# Patient Record
Sex: MALE | Race: WHITE | Employment: UNEMPLOYED | URBAN - METROPOLITAN AREA
[De-identification: names, ages, dates, MRNs, and addresses within clinical notes are randomized per-mention and may not be internally consistent; named-entity substitution may affect disease eponyms.]

---

## 2023-10-12 ENCOUNTER — TELEPHONE (OUTPATIENT)
Dept: PEDIATRIC NEUROLOGY | Facility: HOSPITAL | Age: 28
End: 2023-10-12
Payer: MEDICAID

## 2023-10-12 DIAGNOSIS — F39 MOOD DISORDER (CMS-HCC): ICD-10-CM

## 2023-10-12 DIAGNOSIS — F91.9 DISRUPTIVE BEHAVIOR DISORDER: ICD-10-CM

## 2023-10-12 RX ORDER — DIVALPROEX SODIUM 500 MG/1
1000 TABLET, FILM COATED, EXTENDED RELEASE ORAL 2 TIMES DAILY
COMMUNITY
Start: 2015-04-20 | End: 2023-10-12 | Stop reason: SDUPTHER

## 2023-10-12 RX ORDER — TOPIRAMATE 100 MG/1
100 TABLET, FILM COATED ORAL 2 TIMES DAILY
COMMUNITY
Start: 2015-03-04 | End: 2023-10-12 | Stop reason: SDUPTHER

## 2023-10-12 RX ORDER — LAMOTRIGINE 150 MG/1
75 TABLET ORAL 2 TIMES DAILY
Qty: 30 TABLET | Refills: 5 | Status: SHIPPED | OUTPATIENT
Start: 2023-10-12 | End: 2024-03-22 | Stop reason: SDUPTHER

## 2023-10-12 RX ORDER — DIVALPROEX SODIUM 500 MG/1
1000 TABLET, FILM COATED, EXTENDED RELEASE ORAL 2 TIMES DAILY
Qty: 120 TABLET | Refills: 5 | Status: SHIPPED | OUTPATIENT
Start: 2023-10-12 | End: 2024-03-22 | Stop reason: SDUPTHER

## 2023-10-12 RX ORDER — ZIPRASIDONE HYDROCHLORIDE 60 MG/1
60 CAPSULE ORAL
COMMUNITY
Start: 2016-01-14 | End: 2023-10-12 | Stop reason: SDUPTHER

## 2023-10-12 RX ORDER — LAMOTRIGINE 150 MG/1
75 TABLET ORAL 2 TIMES DAILY
COMMUNITY
Start: 2015-08-14 | End: 2023-10-12 | Stop reason: SDUPTHER

## 2023-10-12 RX ORDER — ZIPRASIDONE HYDROCHLORIDE 60 MG/1
60 CAPSULE ORAL
Qty: 60 CAPSULE | Refills: 5 | Status: SHIPPED | OUTPATIENT
Start: 2023-10-12 | End: 2024-03-22 | Stop reason: SDUPTHER

## 2023-10-12 RX ORDER — TOPIRAMATE 100 MG/1
100 TABLET, FILM COATED ORAL 2 TIMES DAILY
Qty: 60 TABLET | Refills: 5 | Status: SHIPPED | OUTPATIENT
Start: 2023-10-12 | End: 2023-10-13 | Stop reason: SDUPTHER

## 2023-10-12 NOTE — TELEPHONE ENCOUNTER
Rx Refill Request Telephone Encounter    Name:  Raheem Spence  :  [id number]  Medication Name:  4 REFILL SCRIPTS    1)  DIAVALPROEX SODIUM  MG ORAL TABLET EXTENDED RELEASE 24 HOUR TAKE 2 TAB (1,000 MG) BY MOUTH TWICE DAILY  2)  LAMOTRIGINE - 150 MG ORAL TAB; GIVE 1/2 TAB (75MG) BY MOUTH TWICE DAILY  3) TOPIRAMATE 100 MG ORAL TAB; TAKE 1 TAB BY MOUTH 2X A DAY  4)  ZIPRASIDONE HCl - 60 MG ORAL CAP ; GIVE 1 CAP BY MOUTH 2X A DAY  Specific Pharmacy location:  [facility]  Date of last appointment:  23  Date of next appointment:  -  Best number to reach patient:  -

## 2023-10-13 ENCOUNTER — TELEPHONE (OUTPATIENT)
Dept: PEDIATRIC NEUROLOGY | Facility: HOSPITAL | Age: 28
End: 2023-10-13
Payer: MEDICAID

## 2023-10-13 DIAGNOSIS — F39 MOOD DISORDER (CMS-HCC): ICD-10-CM

## 2023-10-13 RX ORDER — TOPIRAMATE 100 MG/1
100 TABLET, FILM COATED ORAL NIGHTLY
Qty: 30 TABLET | Refills: 5 | Status: SHIPPED | OUTPATIENT
Start: 2023-10-13 | End: 2024-03-22

## 2023-10-13 NOTE — TELEPHONE ENCOUNTER
In old system- script was for 1-100mg tab at bedtime.  Per dr. Krishnamurthy last clinic note 1 tab. I do not see any further phone notes where it was increased to BID. Will call pharmacy

## 2023-10-13 NOTE — TELEPHONE ENCOUNTER
Pharm is calling to clarify a scripit   They received a scripit for topiramate  100mg 1 tab twice daily but they have on record 100 mg 1 tab by mouth at bedtime, ( this is what is in the old system)   Clarification is needed  Please Call.

## 2023-11-09 DIAGNOSIS — F41.9 ANXIETY: ICD-10-CM

## 2023-11-09 RX ORDER — LORAZEPAM 1 MG/1
1 TABLET ORAL 2 TIMES DAILY
COMMUNITY
Start: 2015-05-22 | End: 2023-11-09 | Stop reason: SDUPTHER

## 2023-11-09 RX ORDER — LORAZEPAM 1 MG/1
1 TABLET ORAL 2 TIMES DAILY
Qty: 56 TABLET | Refills: 2 | Status: SHIPPED | OUTPATIENT
Start: 2023-11-09 | End: 2024-01-24

## 2024-01-24 DIAGNOSIS — F41.9 ANXIETY: ICD-10-CM

## 2024-01-24 RX ORDER — LORAZEPAM 1 MG/1
1 TABLET ORAL 2 TIMES DAILY
Qty: 56 TABLET | Refills: 2 | Status: SHIPPED | OUTPATIENT
Start: 2024-01-24 | End: 2024-04-16 | Stop reason: SDUPTHER

## 2024-01-25 ENCOUNTER — OFFICE VISIT (OUTPATIENT)
Dept: PEDIATRIC NEUROLOGY | Facility: CLINIC | Age: 29
End: 2024-01-25
Payer: MEDICAID

## 2024-01-25 VITALS — BODY MASS INDEX: 29.71 KG/M2 | HEIGHT: 76 IN | WEIGHT: 244 LBS

## 2024-01-25 DIAGNOSIS — F39 MOOD DISORDER (CMS-HCC): Primary | ICD-10-CM

## 2024-01-25 PROBLEM — F31.9 BIPOLAR DISORDER (MULTI): Status: ACTIVE | Noted: 2019-10-17

## 2024-01-25 PROBLEM — E55.9 VITAMIN D DEFICIENCY: Status: ACTIVE | Noted: 2021-08-19

## 2024-01-25 PROBLEM — I10 ESSENTIAL HYPERTENSION: Status: ACTIVE | Noted: 2019-11-20

## 2024-01-25 PROBLEM — F41.9 ANXIETY: Status: ACTIVE | Noted: 2019-01-24

## 2024-01-25 PROBLEM — F84.0 AUTISTIC DISORDER (HHS-HCC): Status: ACTIVE | Noted: 2019-10-17

## 2024-01-25 PROCEDURE — 1036F TOBACCO NON-USER: CPT | Performed by: PSYCHIATRY & NEUROLOGY

## 2024-01-25 PROCEDURE — 99213 OFFICE O/P EST LOW 20 MIN: CPT | Performed by: PSYCHIATRY & NEUROLOGY

## 2024-01-25 RX ORDER — LISINOPRIL 10 MG/1
10 TABLET ORAL
COMMUNITY
Start: 2023-08-14 | End: 2024-08-13

## 2024-01-25 RX ORDER — CHOLECALCIFEROL (VITAMIN D3) 125 MCG
5000 CAPSULE ORAL
COMMUNITY
Start: 2023-08-14 | End: 2024-02-10

## 2024-01-25 NOTE — PATIENT INSTRUCTIONS
Merrill is doing well at home.  He has disruptive behaviors at workshops but not in public.  It is possible that he may have some anxiety or stress that leads to the workshop behaviors.  However, since he is doing well at this time, no medication change is indicated.  He has no reported medication side effects.    Continue present medications.  Renew as needed.  Check valproic acid, lamotrigine and topiramate levels with next blood draw (Done yearly).  He has a normal CBC.  If he starts a new day program and has issues, will consider a medication adjustment if the behavior management is good.  Follow up in 6 months.

## 2024-01-25 NOTE — LETTER
2024     Dwayne Roberts MD  [address]  [address]  [facility] [address]    Patient: Raheem Spence   YOB: 1995   Date of Visit: 2024       Dear Dr. Dwayne Roberts MD:    Thank you for referring Raheem Spence to me for evaluation. Below are my notes for this consultation.  If you have questions, please do not hesitate to call me. I look forward to following your patient along with you.       Sincerely,     Grey Gardner MD      CC: No Recipients  ______________________________________________________________________________________    Subjective  Raheem Spence is a 28 y.o.  .an with ASD and mood disturbance.  ISAIAH Momin is a 28 year old man with ASD and a mood disturbance. He takes Geodon 60 mg bid, lorazepam 1 mg bid, lamotrigine 75 mg bid (no major change with dose decrease), Depakote 1000 mg bid and topiramate 100 mg qHS. Behavior is usually OK at his home. He is usually in a good mood with occasional upsets (not getting what he wants) and a regular routine of movies, drawing, and physical activity. His mother visits every 2 weeks (since grandmother is in a nursing home and grandfather ).      Merrill has been in 3 day programs.  He did adequately in one program (ended due to COVID) and was asked to leave 2 other programs.  He had several outbursts (reason not known but may be related to more and unfamiliar people) that included hitting. This is not usually a problem at home or when he spent weekend with his grandparents.  He does OK with community activities with staff as long as he is not near diapers.     He had a normal lipids, CMP and CBC and a valproic acid level of 108 ugm/ml, topiramate of 4.5 ugm/ml, and lamotrigine level of 14.3 ugm/ml in 2023.     Merrill is eating and sleeping OK.      Review of Systems  All other systems have been reviewed with no other pertinent positives. He takes lisinopril 10 mg daily for hypertension.     Objective  Neurological  Exam  Mental Status  Awake and alert.  Talks to himself (scripts)  Good mood  Shakes hands with eye contact.    Cranial Nerves  CN III, IV, VI: Extraocular movements intact bilaterally.  CN VII: Full and symmetric facial movement.    Motor   No abnormal involuntary movements. Strength is 5/5 throughout all four extremities.    Coordination    No tremor or ataxia.    Gait    Walks well.    Physical Exam  Constitutional:       General: He is awake.   Eyes:      Extraocular Movements: Extraocular movements intact.   Pulmonary:      Effort: Pulmonary effort is normal.   Abdominal:      Palpations: Abdomen is soft.   Neurological:      Mental Status: He is alert.      Motor: Motor strength is normal.        Assessment/Plan    Merrill is doing well at home.  He has disruptive behaviors at workshops but not in public.  It is possible that he may have some anxiety or stress that leads to the workshop behaviors.  However, since he is doing well at this time, no medication change is indicated.  He has no reported medication side effects.    Continue present medications.  Renew as needed.  Check valproic acid, lamotrigine and topiramate levels with next blood draw (Done yearly).  He has a normal CBC.  If he starts a new day program and has issues, will consider a medication adjustment if the behavior management is good.  Follow up in 6 months.

## 2024-01-25 NOTE — PROGRESS NOTES
Subjective   Raheem Spence is a 28 y.o.  .an with ASD and mood disturbance.  ISAIAH Momin is a 28 year old man with ASD and a mood disturbance. He takes Geodon 60 mg bid, lorazepam 1 mg bid, lamotrigine 75 mg bid (no major change with dose decrease), Depakote 1000 mg bid and topiramate 100 mg qHS. Behavior is usually OK at his home. He is usually in a good mood with occasional upsets (not getting what he wants) and a regular routine of movies, drawing, and physical activity. His mother visits every 2 weeks (since grandmother is in a nursing home and grandfather ).      Merrill has been in 3 day programs.  He did adequately in one program (ended due to COVID) and was asked to leave 2 other programs.  He had several outbursts (reason not known but may be related to more and unfamiliar people) that included hitting. This is not usually a problem at home or when he spent weekend with his grandparents.  He does OK with community activities with staff as long as he is not near diapers.     He had a normal lipids, CMP and CBC and a valproic acid level of 108 ugm/ml, topiramate of 4.5 ugm/ml, and lamotrigine level of 14.3 ugm/ml in 2023.     Merrill is eating and sleeping OK.      Review of Systems  All other systems have been reviewed with no other pertinent positives. He takes lisinopril 10 mg daily for hypertension.     Objective   Neurological Exam  Mental Status  Awake and alert.  Talks to himself (scripts)  Good mood  Shakes hands with eye contact.    Cranial Nerves  CN III, IV, VI: Extraocular movements intact bilaterally.  CN VII: Full and symmetric facial movement.    Motor   No abnormal involuntary movements. Strength is 5/5 throughout all four extremities.    Coordination    No tremor or ataxia.    Gait    Walks well.    Physical Exam  Constitutional:       General: He is awake.   Eyes:      Extraocular Movements: Extraocular movements intact.   Pulmonary:      Effort: Pulmonary effort is normal.    Abdominal:      Palpations: Abdomen is soft.   Neurological:      Mental Status: He is alert.      Motor: Motor strength is normal.        Assessment/Plan     Merrill is doing well at home.  He has disruptive behaviors at workshops but not in public.  It is possible that he may have some anxiety or stress that leads to the workshop behaviors.  However, since he is doing well at this time, no medication change is indicated.  He has no reported medication side effects.    Continue present medications.  Renew as needed.  Check valproic acid, lamotrigine and topiramate levels with next blood draw (Done yearly).  He has a normal CBC.  If he starts a new day program and has issues, will consider a medication adjustment if the behavior management is good.  Follow up in 6 months.

## 2024-03-22 DIAGNOSIS — F91.9 DISRUPTIVE BEHAVIOR DISORDER: ICD-10-CM

## 2024-03-22 DIAGNOSIS — F39 MOOD DISORDER (CMS-HCC): ICD-10-CM

## 2024-03-22 RX ORDER — TOPIRAMATE 100 MG/1
TABLET, FILM COATED ORAL
Qty: 30 TABLET | Refills: 4 | Status: SHIPPED | OUTPATIENT
Start: 2024-03-22

## 2024-03-22 RX ORDER — LAMOTRIGINE 150 MG/1
75 TABLET ORAL 2 TIMES DAILY
Qty: 30 TABLET | Refills: 5 | Status: SHIPPED | OUTPATIENT
Start: 2024-03-22 | End: 2024-09-18

## 2024-03-22 RX ORDER — DIVALPROEX SODIUM 500 MG/1
1000 TABLET, FILM COATED, EXTENDED RELEASE ORAL 2 TIMES DAILY
Qty: 120 TABLET | Refills: 5 | Status: SHIPPED | OUTPATIENT
Start: 2024-03-22 | End: 2024-09-18

## 2024-03-22 RX ORDER — ZIPRASIDONE HYDROCHLORIDE 60 MG/1
60 CAPSULE ORAL
Qty: 60 CAPSULE | Refills: 5 | Status: SHIPPED | OUTPATIENT
Start: 2024-03-22 | End: 2024-09-18

## 2024-04-16 DIAGNOSIS — F41.9 ANXIETY: ICD-10-CM

## 2024-04-16 RX ORDER — LORAZEPAM 1 MG/1
1 TABLET ORAL 2 TIMES DAILY
Qty: 60 TABLET | Refills: 2 | Status: SHIPPED | OUTPATIENT
Start: 2024-04-16 | End: 2024-07-15

## 2024-07-17 DIAGNOSIS — F41.9 ANXIETY: ICD-10-CM

## 2024-07-17 RX ORDER — LORAZEPAM 1 MG/1
1 TABLET ORAL 2 TIMES DAILY
Qty: 60 TABLET | Refills: 2 | Status: SHIPPED | OUTPATIENT
Start: 2024-07-17 | End: 2024-10-15

## 2024-07-25 ENCOUNTER — OFFICE VISIT (OUTPATIENT)
Dept: PEDIATRIC NEUROLOGY | Facility: CLINIC | Age: 29
End: 2024-07-25
Payer: MEDICAID

## 2024-07-25 VITALS
BODY MASS INDEX: 32.15 KG/M2 | HEIGHT: 75 IN | DIASTOLIC BLOOD PRESSURE: 73 MMHG | HEART RATE: 86 BPM | WEIGHT: 258.6 LBS | SYSTOLIC BLOOD PRESSURE: 114 MMHG

## 2024-07-25 DIAGNOSIS — F31.70 BIPOLAR DISORDER IN FULL REMISSION, MOST RECENT EPISODE UNSPECIFIED TYPE (MULTI): Primary | ICD-10-CM

## 2024-07-25 DIAGNOSIS — F41.9 ANXIETY: ICD-10-CM

## 2024-07-25 DIAGNOSIS — F84.0 AUTISTIC DISORDER (HHS-HCC): ICD-10-CM

## 2024-07-25 PROCEDURE — 3074F SYST BP LT 130 MM HG: CPT | Performed by: PSYCHIATRY & NEUROLOGY

## 2024-07-25 PROCEDURE — 3008F BODY MASS INDEX DOCD: CPT | Performed by: PSYCHIATRY & NEUROLOGY

## 2024-07-25 PROCEDURE — 99213 OFFICE O/P EST LOW 20 MIN: CPT | Performed by: PSYCHIATRY & NEUROLOGY

## 2024-07-25 PROCEDURE — 3078F DIAST BP <80 MM HG: CPT | Performed by: PSYCHIATRY & NEUROLOGY

## 2024-07-25 NOTE — LETTER
July 26, 2024     Dwayne Roberts MD  [address]  [address]  [facility] [address]    Patient: Raheem Spence   YOB: 1995   Date of Visit: 7/25/2024       Dear Dr. Dwayne Roberts MD:    Thank you for referring Raheem Spence to me for evaluation. Below are my notes for this consultation.  If you have questions, please do not hesitate to call me. I look forward to following your patient along with you.       Sincerely,     Grey Gardner MD      CC: No Recipients  ______________________________________________________________________________________    Subjective  Raheem Spence is a 28 y.o.  man with ASD and a mood disorder.    ISAIAH Momin is a 28 year old man with ASD and a mood disturbance. He takes Geodon 60 mg bid, lorazepam 1 mg bid, lamotrigine 75 mg bid (no major change with dose decrease), Depakote 1000 mg bid and topiramate 100 mg qHS. Behavior is usually OK at his home. He is usually in a good mood with occasional upsets (not getting what he wants) and a regular routine of movies, drawing, and physical activity. His mother visits every 2 weeks.      Merrill has been in 3 day programs.  He did adequately in one program (ended due to COVID) and was asked to leave 2 other programs.  He had several outbursts (reason not known but may be related to more and unfamiliar people) that included hitting. This is not usually a problem at home or when he spent weekend with his grandparents.  He does OK with community activities with staff as long as he is not near diapers.     He had a normal lipids, CMP and  in July 2024 and lamotrigine level of 14.3 ugm/ml in January 2023.     Merrill is eating and sleeping OK.      Review of Systems  All other systems have been reviewed with no other pertinent positives. He takes lisinopril 10 mg daily for hypertension.     Objective  Neurological Exam  Mental Status    Writes lists of TV shows, restaurants, and other items  Good mood  Speaks in short  utterances.    Gait  Casual gait is normal including stance, stride, and arm swing.    Physical Exam  Pulmonary:      Effort: Pulmonary effort is normal.       Assessment/Plan    Raheem is doing well.  Mood is stable.  There are no major behavior issues.    No change in medication except considering decrease of lamotrigine to 50 mg twice daily if mother agrees.  Call if the decrease leads to depressed mood or increased irritability/upsets.  Call if problems  Follow up in 6 months.

## 2024-07-25 NOTE — PATIENT INSTRUCTIONS
Raheem is doing well.  Mood is stable.  There are no major behavior issues.    No change in medication except considering decrease of lamotrigine to 50 mg twice daily if mother agrees.  Call if the decrease leads to depressed mood or increased irritability/upsets.  Call if problems  Follow up in 6 months.

## 2024-07-25 NOTE — PROGRESS NOTES
Subjective   Raheem Spence is a 28 y.o.  man with ASD and a mood disorder.    HPI    Merrill is a 28 year old man with ASD and a mood disturbance. He takes Geodon 60 mg bid, lorazepam 1 mg bid, lamotrigine 75 mg bid (no major change with dose decrease), Depakote 1000 mg bid and topiramate 100 mg qHS. Behavior is usually OK at his home. He is usually in a good mood with occasional upsets (not getting what he wants) and a regular routine of movies, drawing, and physical activity. His mother visits every 2 weeks.      Merrill has been in 3 day programs.  He did adequately in one program (ended due to COVID) and was asked to leave 2 other programs.  He had several outbursts (reason not known but may be related to more and unfamiliar people) that included hitting. This is not usually a problem at home or when he spent weekend with his grandparents.  He does OK with community activities with staff as long as he is not near diapers.     He had a normal lipids, CMP and  in July 2024 and lamotrigine level of 14.3 ugm/ml in January 2023.     Merrill is eating and sleeping OK.      Review of Systems  All other systems have been reviewed with no other pertinent positives. He takes lisinopril 10 mg daily for hypertension.     Objective   Neurological Exam  Mental Status    Writes lists of TV shows, restaurants, and other items  Good mood  Speaks in short utterances.    Gait  Casual gait is normal including stance, stride, and arm swing.    Physical Exam  Pulmonary:      Effort: Pulmonary effort is normal.       Assessment/Plan     Raheem is doing well.  Mood is stable.  There are no major behavior issues.    No change in medication except considering decrease of lamotrigine to 50 mg twice daily if mother agrees.  Call if the decrease leads to depressed mood or increased irritability/upsets.  Call if problems  Follow up in 6 months.

## 2024-08-06 DIAGNOSIS — F39 MOOD DISORDER (CMS-HCC): ICD-10-CM

## 2024-08-06 RX ORDER — TOPIRAMATE 100 MG/1
TABLET, FILM COATED ORAL
Qty: 30 TABLET | Refills: 3 | Status: SHIPPED | OUTPATIENT
Start: 2024-08-06

## 2024-09-04 DIAGNOSIS — F91.9 DISRUPTIVE BEHAVIOR DISORDER: ICD-10-CM

## 2024-09-04 DIAGNOSIS — F39 MOOD DISORDER (CMS-HCC): ICD-10-CM

## 2024-09-04 RX ORDER — LAMOTRIGINE 150 MG/1
TABLET ORAL
Qty: 30 TABLET | Refills: 4 | Status: SHIPPED | OUTPATIENT
Start: 2024-09-04

## 2024-09-04 RX ORDER — ZIPRASIDONE HYDROCHLORIDE 60 MG/1
60 CAPSULE ORAL 2 TIMES DAILY
Qty: 60 CAPSULE | Refills: 4 | Status: SHIPPED | OUTPATIENT
Start: 2024-09-04

## 2024-09-04 RX ORDER — DIVALPROEX SODIUM 500 MG/1
TABLET, FILM COATED, EXTENDED RELEASE ORAL
Qty: 120 TABLET | Refills: 4 | Status: SHIPPED | OUTPATIENT
Start: 2024-09-04

## 2024-10-01 ENCOUNTER — TELEPHONE (OUTPATIENT)
Dept: PEDIATRIC NEUROLOGY | Facility: CLINIC | Age: 29
End: 2024-10-01
Payer: MEDICAID

## 2024-10-01 NOTE — TELEPHONE ENCOUNTER
Discussed with Dr Gardner. Find out his activity level during the day, is he bored and not wearing himself out to need to sleep? Can he find a new day program to help with this? Do any of his medications look any different? At his age, it is likely there is a cause for this change in his sleep so we need to determine what it is rather than add more medications.

## 2024-10-01 NOTE — TELEPHONE ENCOUNTER
Called and spoke with Will. Reviewed with him that at Raheem's age it is likely that there is a cause behind his recent sleep difficulties. Per Will, his medications look the same. Raheem lives alone with staff at his mom's request and he does feel he is likely bored since he does not attend a day program/workshop. They will monitor Raheem and see if they can find any particular reason for the sleep changes as well as find things to get him involved in. If they determine a reason for the sleep changes, will call back to discuss. Otherwise, they will follow up at the next appointment. Will verbalized understanding of the plan and states no further questions at this time.

## 2024-10-01 NOTE — TELEPHONE ENCOUNTER
Caregiver, Will, calling to report Raheem not sleeping well for the last month. He has a script for melatonin but it isn't helping. They are looking for another option to aid in his sleep as his lack of sleep is causing some aggressive behaviors towards staff.    [phone]

## 2024-10-01 NOTE — TELEPHONE ENCOUNTER
Called and spoke with Will. For the last 1-1.5 months Ricas sleep has been poor. Staff feel this is causing some aggressive behaviors. He is on 3mg melatonin but it isn't doing anything so they are looking for other options. Issues are with falling asleep and staying asleep. Raheem will only sleep short periods or won't sleep at night and try to sleep during the day instead. No environmental or routine changes. No staff changes at his group home. He lays down around 10-10:30pm and falls sleep around 12:30-1am. He is awake by 7-8am. At least once per week he stays up all night though and then is tired and wanting to nap during the day.     117kg  Depakote ER 500mg- 2 tabs BID  Lamotrigine 150mg- 1/2 tab BID  Lorazepam 1mg- 1 tab BID  Topiramate 100mg- 1 tab HS  Ziprasidone 60mg- 1 cap BID

## 2024-10-03 DIAGNOSIS — F41.9 ANXIETY: ICD-10-CM

## 2024-10-03 RX ORDER — LORAZEPAM 1 MG/1
1 TABLET ORAL 2 TIMES DAILY
Qty: 60 TABLET | Refills: 2 | Status: SHIPPED | OUTPATIENT
Start: 2024-10-03 | End: 2025-01-01

## 2024-11-26 DIAGNOSIS — F39 MOOD DISORDER (CMS-HCC): ICD-10-CM

## 2024-11-27 RX ORDER — TOPIRAMATE 100 MG/1
TABLET, FILM COATED ORAL
Qty: 30 TABLET | Refills: 2 | Status: SHIPPED | OUTPATIENT
Start: 2024-11-27

## 2024-12-26 DIAGNOSIS — F41.9 ANXIETY: ICD-10-CM

## 2024-12-26 RX ORDER — LORAZEPAM 1 MG/1
1 TABLET ORAL 2 TIMES DAILY
Qty: 60 TABLET | Refills: 2 | Status: SHIPPED | OUTPATIENT
Start: 2024-12-26 | End: 2025-03-26